# Patient Record
Sex: MALE | Race: WHITE | URBAN - METROPOLITAN AREA
[De-identification: names, ages, dates, MRNs, and addresses within clinical notes are randomized per-mention and may not be internally consistent; named-entity substitution may affect disease eponyms.]

---

## 2021-09-06 ENCOUNTER — APPOINTMENT (OUTPATIENT)
Dept: GENERAL RADIOLOGY | Age: 51
End: 2021-09-06

## 2021-09-06 ENCOUNTER — HOSPITAL ENCOUNTER (EMERGENCY)
Age: 51
Discharge: HOME | End: 2021-09-06
Attending: STUDENT IN AN ORGANIZED HEALTH CARE EDUCATION/TRAINING PROGRAM

## 2021-09-06 VITALS
RESPIRATION RATE: 16 BRPM | SYSTOLIC BLOOD PRESSURE: 131 MMHG | HEART RATE: 83 BPM | TEMPERATURE: 98.3 F | DIASTOLIC BLOOD PRESSURE: 79 MMHG | OXYGEN SATURATION: 95 %
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
## 2021-09-06 DIAGNOSIS — S70.11XA CONTUSION OF RIGHT THIGH, INITIAL ENCOUNTER: Primary | ICD-10-CM

## 2021-09-06 PROCEDURE — 73502 X-RAY EXAM HIP UNI 2-3 VIEWS: CPT

## 2021-09-06 PROCEDURE — 73590 X-RAY EXAM OF LOWER LEG: CPT

## 2021-09-06 PROCEDURE — 99284 EMERGENCY DEPT VISIT MOD MDM: CPT

## 2021-09-06 PROCEDURE — 6370000000 HC RX 637 (ALT 250 FOR IP): Performed by: STUDENT IN AN ORGANIZED HEALTH CARE EDUCATION/TRAINING PROGRAM

## 2021-09-06 PROCEDURE — 73552 X-RAY EXAM OF FEMUR 2/>: CPT

## 2021-09-06 RX ORDER — OXYCODONE HYDROCHLORIDE AND ACETAMINOPHEN 5; 325 MG/1; MG/1
2 TABLET ORAL ONCE
Status: COMPLETED | OUTPATIENT
Start: 2021-09-06 | End: 2021-09-06

## 2021-09-06 RX ADMIN — OXYCODONE HYDROCHLORIDE AND ACETAMINOPHEN 2 TABLET: 5; 325 TABLET ORAL at 18:00

## 2021-09-06 ASSESSMENT — PAIN DESCRIPTION - LOCATION: LOCATION: LEG

## 2021-09-06 ASSESSMENT — PAIN SCALES - GENERAL
PAINLEVEL_OUTOF10: 10
PAINLEVEL_OUTOF10: 10
PAINLEVEL_OUTOF10: 8

## 2021-09-06 ASSESSMENT — PAIN DESCRIPTION - ORIENTATION: ORIENTATION: RIGHT;UPPER

## 2021-09-06 ASSESSMENT — PAIN DESCRIPTION - PAIN TYPE: TYPE: ACUTE PAIN

## 2021-09-07 NOTE — ED PROVIDER NOTES
[address] [facility]      Pt Name: Va Mayen  MRN: [id number]  [place] 1970  Date of evaluation: 9/6/2021  Provider: Kalina Waldron MD    CHIEF COMPLAINT       Chief Complaint   Patient presents with    Fall     slipped and fell off 8ft ladder on 2x2    Leg Pain     right upper leg     Fall    HISTORY OF PRESENT ILLNESS   (Location/Symptom, Timing/Onset,Context/Setting, Quality, Duration, Modifying Factors, Severity)  Note limiting factors. Va Mayen is a 44 y.o. male who presents to the emergency department c/o R hip and R leg pain s/p fall from the top step of an 8 foot ladder just prior to arrival.  Pt was standing on the top step of a ladder when he slipped and it went out from under him, falling onto his R leg, striking a pile of 2 x 2s when he came down. Denies head trauma, LOC, chest pain, abd pain, back pain, pelvic pain, focal weakness. Pain localized to the anterior mid thigh rad to the R hip and also the R leg distal to the knee but within the proximal third of the tibia. Denies sensory loss or inability to range the knee or ankle. Denies preceding syncope, chest pain, anticoagulant use. Symptoms not otherwise alleviated or exacerbated by other factors. NursingNotes were reviewed. REVIEW OF SYSTEMS    (2-9 systems for level 4, 10 or more for level 5)       Constitutional: No fever or chills. Eye: No visual disturbances. No eye pain. Ear/Nose/Mouth/Throat: No nasal congestion. No sore throat. Respiratory: No cough, No shortness of breath, No sputum production. Cardiovascular: No chest pain. No palpitations. Gastrointestinal: No abdominal pain. No nausea or vomiting  Genitourinary: No dysuria. No hematuria. Hematology/Lymphatics: No bleeding or bruising tendency. Immunologic: No malaise. No swollen glands. Musculoskeletal: No back pain. R hip and R leg pain as in HPI. Integumentary: No rash.  No abrasions. Neurologic: No headache. No focal numbness or weakness. PAST MEDICAL HISTORY   History reviewed. No pertinent past medical history. SURGICALHISTORY     History reviewed. No pertinent surgical history. CURRENT MEDICATIONS     None. ALLERGIES     Robaxin [methocarbamol]    FAMILY HISTORY     History reviewed. No pertinent family history. SOCIAL HISTORY       Social History     Socioeconomic History    Marital status:      Spouse name: None    Number of children: None    Years of education: None    Highest education level: None   Occupational History    None   Tobacco Use    Smoking status: Current Every Day Smoker     Packs/day: 1.00     Types: Cigars    Smokeless tobacco: Never Used   Substance and Sexual Activity    Alcohol use: None    Drug use: Yes     Types: Marijuana     Comment: daily    Sexual activity: None   Other Topics Concern    None   Social History Narrative    None     Social Determinants of Health     Financial Resource Strain:     Difficulty of Paying Living Expenses:    Food Insecurity:     Worried About Running Out of Food in the Last Year:     Ran Out of Food in the Last Year:    Transportation Needs:     Lack of Transportation (Medical):      Lack of Transportation (Non-Medical):    Physical Activity:     Days of Exercise per Week:     Minutes of Exercise per Session:    Stress:     Feeling of Stress :    Social Connections:     Frequency of Communication with Friends and Family:     Frequency of Social Gatherings with Friends and Family:     Attends Muslim Services:     Active Member of Clubs or Organizations:     Attends Club or Organization Meetings:     Marital Status:    Intimate Partner Violence:     Fear of Current or Ex-Partner:     Emotionally Abused:     Physically Abused:     Sexually Abused:        SCREENINGS             PHYSICAL EXAM    (up to 7 for level 4, 8 or more for level 5)     ED Triage Vitals [09/06/21 1726]   BP Temp Temp Source Pulse Resp SpO2 Height Weight   132/81 98.3 °F (36.8 °C) Temporal 83 17 94 % -- --       General: Alert and oriented appropriately for age, No acute distress. Eye: Normal conjunctiva. Pupils equal and reactive. HENT: Oral mucosa is moist.  Normocephalic, atraumatic  CERVICAL SPINE: There is no cervical midline tenderness to palpation, step-offs, or acute deformities. No posterior midline pain on full ROM. The cervical spine has been cleared clinically. Respiratory: Respirations even and non-labored. Lungs clear auscultation bilaterally. No chest wall tenderness to palpation. Cardiovascular: Normal rate, Regular rhythm. Intact peripheral system no edema. No JVD  Gastrointestinal: Soft, Non-tender, Non-distended. Musculoskeletal: No swelling. Entire spine nontender to palpation. No step-offs. Intact range of motion. Right anterior thigh tender to palpation, does allow range of motion of the right hip, right knee, but range of motion of the right knee causes pain in the midshaft of the right femur. Ecchymosis, small abrasion to the right anterior leg at the proximal third of the tibia, tender to palpation in the location. Extensor mechanism intact. Strength 5 out of 5 GS/TA/EHL/FHL. Sensation intact to light touch in the SP/DP/sural/saphenous/tibial distribution. Integumentary: Warm, Dry. Neurologic: Alert and appropriate for age. No focal deficits. Psychiatric: Cooperative. DIAGNOSTIC RESULTS         RADIOLOGY:   Non-plain filmimages such as CT, Ultrasound and MRI are read by the radiologist. Plain radiographic images are visualized and preliminarily interpreted by the emergency physician with the below findings:      Interpretation per the Radiologist below, if available at the time ofthis note:    XR TIBIA FIBULA RIGHT (2 VIEWS)   Final Result   No acute osseous abnormality of the right tib-fib. XR HIP 2-3 VW W PELVIS RIGHT   Final Result   1.  No acute osseous abnormality of the right femur. 2. No acute osseous abnormality of the pelvis or right hip. XR FEMUR RIGHT (MIN 2 VIEWS)   Final Result   1. No acute osseous abnormality of the right femur. 2. No acute osseous abnormality of the pelvis or right hip. EMERGENCY DEPARTMENT COURSE and DIFFERENTIAL DIAGNOSIS/MDM:   Vitals:    Vitals:    21 1726 21 1830 21   BP: 132/81 129/74 131/79   Pulse: 83 80 83   Resp: 17 16 16   Temp: 98.3 °F (36.8 °C)     TempSrc: Temporal     SpO2: 94%  95%         Medical decision makin-year-old man who fell from a height approximately 8 feet onto his right leg, denies head trauma, LOC, states that he overextended himself while reaching from the top step of a ladder. Not clinically intoxicated. GCS 15. Neurologically intact throughout. No tenderness to palpation in distributions as above, concerning for potential hip fracture, femur fracture, tibial fracture based on the distribution of tenderness on examination and mechanism of his injury. Closed injuries, neurovascularly intact. X-rays obtained of the right hip, femur, tibia and fibula reveal no acute abnormalities. Patient feeling much better after Percocet given, able to range his right hip and right knee, stable for and amenable to discharge home. Instructed on proper use of ladders, voiced understanding. Given discharge instructions and return precautions, has outpatient follow-up. Recommended conservative measures, rest, ice, elevation, NSAIDs for treatment of likely anterior thigh contusion. Voiced understanding, discharged home, ambulated steadily from the emergency department upon discharge. Medications   oxyCODONE-acetaminophen (PERCOCET) 5-325 MG per tablet 2 tablet (2 tablets Oral Given 21 1800)              FINAL IMPRESSION      1. Contusion of right thigh, initial encounter    2.  Fall from ladder, initial encounter          DISPOSITION/PLAN DISPOSITION Decision To Discharge 09/06/2021 07:49:19 PM      PATIENT REFERRED TO:  [address] [facility] Emergency Department  [address]  [phone]    If symptoms worsen    [facility]) Pre-Services  [phone]             (Please note that portions of this note were completed with a voice recognition program.Efforts were made to edit the dictations but occasionally words are mis-transcribed.)    Nino Trevizo MD (electronically signed)  Attending Emergency Physician          Nino Trevizo MD  09/07/21 1732